# Patient Record
Sex: FEMALE | Race: WHITE | Employment: UNEMPLOYED | URBAN - METROPOLITAN AREA
[De-identification: names, ages, dates, MRNs, and addresses within clinical notes are randomized per-mention and may not be internally consistent; named-entity substitution may affect disease eponyms.]

---

## 2017-01-18 ENCOUNTER — OFFICE VISIT (OUTPATIENT)
Dept: FAMILY MEDICINE CLINIC | Age: 62
End: 2017-01-18

## 2017-01-18 VITALS
BODY MASS INDEX: 25.53 KG/M2 | RESPIRATION RATE: 16 BRPM | SYSTOLIC BLOOD PRESSURE: 128 MMHG | DIASTOLIC BLOOD PRESSURE: 66 MMHG | WEIGHT: 135.2 LBS | HEIGHT: 61 IN | TEMPERATURE: 98.3 F | HEART RATE: 89 BPM | OXYGEN SATURATION: 96 %

## 2017-01-18 DIAGNOSIS — F32.A DEPRESSION, UNSPECIFIED DEPRESSION TYPE: Primary | ICD-10-CM

## 2017-01-18 DIAGNOSIS — E78.5 HYPERLIPIDEMIA, UNSPECIFIED HYPERLIPIDEMIA TYPE: ICD-10-CM

## 2017-01-18 RX ORDER — FLUOXETINE HYDROCHLORIDE 20 MG/1
20 CAPSULE ORAL DAILY
Qty: 30 CAP | Refills: 0 | Status: SHIPPED | OUTPATIENT
Start: 2017-01-18 | End: 2017-07-24

## 2017-01-18 RX ORDER — TRAZODONE HYDROCHLORIDE 50 MG/1
50 TABLET ORAL
Qty: 30 TAB | Refills: 2 | Status: SHIPPED | OUTPATIENT
Start: 2017-01-18 | End: 2017-05-26 | Stop reason: SDUPTHER

## 2017-01-18 RX ORDER — KETOCONAZOLE 20 MG/G
CREAM TOPICAL
COMMUNITY
Start: 2017-01-11

## 2017-01-18 RX ORDER — PRAVASTATIN SODIUM 10 MG/1
10 TABLET ORAL
Qty: 30 TAB | Refills: 3 | Status: SHIPPED | OUTPATIENT
Start: 2017-01-18 | End: 2017-02-15 | Stop reason: SDUPTHER

## 2017-01-18 RX ORDER — BETAMETHASONE DIPROPIONATE 0.5 MG/G
OINTMENT TOPICAL
COMMUNITY
Start: 2017-01-11 | End: 2017-07-24

## 2017-01-18 NOTE — PROGRESS NOTES
1. Have you been to the ER, urgent care clinic since your last visit? Hospitalized since your last visit? No    2. Have you seen or consulted any other health care providers outside of the [address] [place] [address] since your last visit? Include any pap smears or colon screening. Angie Lay [place] on 1/11/17. Patient would like to changing Prozac dose to 20 mg. Patient is not sure if it's the Prozac, but she feels way too anxious. She is not taking Valium.

## 2017-01-18 NOTE — PATIENT INSTRUCTIONS
Depression and Chronic Disease: Care Instructions  Your Care Instructions  A chronic disease is one that you have for a long time. Some chronic diseases can be controlled, but they usually cannot be cured. Depression is common in people with chronic diseases, but it often goes unnoticed. Many people have concerns about seeking treatment for a mental health problem. You may think it's a sign of weakness, or you don't want people to know about it. It's important to overcome these reasons for not seeking treatment. Treating depression or anxiety is good for your health. Follow-up care is a key part of your treatment and safety. Be sure to make and go to all appointments, and call your doctor if you are having problems. It's also a good idea to know your test results and keep a list of the medicines you take. How can you care for yourself at home? Watch for symptoms of depression  The symptoms of depression are often subtle at first. You may think they are caused by your disease rather than depression. Or you may think it is normal to be depressed when you have a chronic disease. If you are depressed you may:  · Feel sad or hopeless. · Feel guilty or worthless. · Not enjoy the things you used to enjoy. · Feel hopeless, as though life is not worth living. · Have trouble thinking or remembering. · Have low energy, and you may not eat or sleep well. · Pull away from others. · Think often about death or killing yourself. (Keep the numbers for these national suicide hotlines: 9-244-869-TALK [1-232.869.2107] and 8-978-UUUUMNE [1-488.174.8140]. )  Get treatment  By treating your depression, you can feel more hopeful and have more energy. If you feel better, you may take better care of yourself, so your health may improve. · Talk to your doctor if you have any changes in mood during treatment for your disease. · Ask your doctor for help.  Counseling, antidepressant medicine, or a combination of the two can help most people with depression. Often a combination works best. Counseling can also help you cope with having a chronic disease. When should you call for help? Call 911 anytime you think you may need emergency care. For example, call if:  · You feel like hurting yourself or someone else. · Someone you know has depression and is about to attempt or is attempting suicide. Call your doctor now or seek immediate medical care if:  · You hear voices. · Someone you know has depression and:  ¨ Starts to give away his or her possessions. ¨ Uses illegal drugs or drinks alcohol heavily. ¨ Talks or writes about death, including writing suicide notes or talking about guns, knives, or pills. ¨ Starts to spend a lot of time alone. ¨ Acts very aggressively or suddenly appears calm. Watch closely for changes in your health, and be sure to contact your doctor if:  · You do not get better as expected. Where can you learn more? Go to http://angela-ashley.info/. Enter B144 in the search box to learn more about \"Depression and Chronic Disease: Care Instructions. \"  Current as of: July 26, 2016  Content Version: 11.1  © 8238-2131 [employer]. Care instructions adapted under license by [employer] (which disclaims liability or warranty for this information). If you have questions about a medical condition or this instruction, always ask your healthcare professional. [address] any warranty or liability for your use of this information.

## 2017-01-18 NOTE — PROGRESS NOTES
HISTORY OF PRESENT ILLNESS  Cash Tang is a 64 y.o. female. HPI: here for follow up on depression / anxiety. On prozac 40mg. Feels too much. Wants to go down on dose. Feels gittery and palpitation on medication since increased the dose . No suicidal ideation. Sleeping trouble. Appetite fair. Weight stable. During holidays did not decorate this year. Brought her mom home for sometime and visited family. Was travelling as well. Current counselor is moving out of area. Going to contact couple of other therapist from the same office and see with whom she is feeling comfortable. Denies any other complains or concern. Denies any headache, dizziness, no chest pain or trouble breathing, no arm or leg weakness. No nausea or vomiting. No urine or bowel complains, no palpitation, no diaphoresis. Visit Vitals    /66 (BP 1 Location: Left arm, BP Patient Position: Sitting)    Pulse 89    Temp 98.3 °F (36.8 °C) (Oral)    Resp 16    Ht 5' 1\" (1.549 m)    Wt 135 lb 3.2 oz (61.3 kg)    SpO2 96%    BMI 25.55 kg/m2     Lab Results   Component Value Date/Time    Cholesterol, total 219 11/01/2016 10:36 AM    HDL Cholesterol 83 11/01/2016 10:36 AM    LDL, calculated 119.8 11/01/2016 10:36 AM    VLDL, calculated 16.2 11/01/2016 10:36 AM    Triglyceride 81 11/01/2016 10:36 AM    CHOL/HDL Ratio 2.6 11/01/2016 10:36 AM     Lab Results   Component Value Date/Time    Sodium 141 11/01/2016 10:36 AM    Potassium 5.0 11/01/2016 10:36 AM    Chloride 104 11/01/2016 10:36 AM    CO2 31 11/01/2016 10:36 AM    Anion gap 6 11/01/2016 10:36 AM    Glucose 97 11/01/2016 10:36 AM    BUN 11 11/01/2016 10:36 AM    Creatinine 0.60 11/01/2016 10:36 AM    BUN/Creatinine ratio 18 11/01/2016 10:36 AM    GFR est AA >60 11/01/2016 10:36 AM    GFR est non-AA >60 11/01/2016 10:36 AM    Calcium 9.1 11/01/2016 10:36 AM    Bilirubin, total 0.4 11/01/2016 10:36 AM    ALT 18 11/01/2016 10:36 AM    AST 16 11/01/2016 10:36 AM    Alk.  phosphatase 42 11/01/2016 10:36 AM    Protein, total 7.2 11/01/2016 10:36 AM    Albumin 4.0 11/01/2016 10:36 AM    Globulin 3.2 11/01/2016 10:36 AM    A-G Ratio 1.3 11/01/2016 10:36 AM     Lab Results   Component Value Date/Time    TSH 2.330 04/06/2015 12:00 AM     Lab Results   Component Value Date/Time    Hemoglobin A1c 5.0 11/01/2016 10:36 AM         ROS: see HPI     Physical Exam   Constitutional: She is oriented to person, place, and time. No distress. Cardiovascular: Normal heart sounds. Pulmonary/Chest: No respiratory distress. She has no wheezes. Abdominal: There is no tenderness. Neurological: She is oriented to person, place, and time. Psychiatric: Thought content normal.   Sad        ASSESSMENT and PLAN    ICD-10-CM ICD-9-CM    1. Depression, unspecified depression type: for now changing medication from prozac to trazodone. Discuss to take both for couple of weeks and then taper prozac. For now she will take prozac 20mg. She will contact counselor to schedule an appt. Advised to call back or come back early with any concern. F32.9 311 traZODone (DESYREL) 50 mg tablet      FLUoxetine (PROZAC) 20 mg capsule   2. Hyperlipidemia, unspecified hyperlipidemia type/ diet modification: given medication refill. E78.5 272.4 pravastatin (PRAVACHOL) 10 mg tablet   Pt understood and agrees with above plan. Review    Follow-up Disposition:  Return in about 4 weeks (around 2/15/2017).

## 2017-01-18 NOTE — MR AVS SNAPSHOT
Visit Information Date & Time Provider Department Dept. Phone Encounter #  
 1/18/2017 12:45 PM Mary Johnson, [address] [facility] [phone] [id number] Follow-up Instructions Return in about 4 weeks (around 2/15/2017). Upcoming Health Maintenance Date Due COLONOSCOPY 6/1/2017 BREAST CANCER SCRN MAMMOGRAM 11/14/2018 DTaP/Tdap/Td series (2 - Td) 4/24/2025 Allergies as of 1/18/2017  Review Complete On: 1/18/2017 By: Johnson Noble Severity Noted Reaction Type Reactions Adhesive Tape-silicones  28/54/5988    Hives Azithromycin  01/13/2011    Hives Buspar [Buspirone]  04/24/2015    Palpitations Clindamycin  01/13/2011    Hives Pcn [Penicillins]  01/13/2011    Hives Statins-hmg-coa Reductase Inhibitors  10/13/2015    Other (comments) Leg cramps Current Immunizations  Never Reviewed Name Date Tdap 4/24/2015 Not reviewed this visit You Were Diagnosed With   
  
 Codes Comments Depression, unspecified depression type    -  Primary ICD-10-CM: F32.9 ICD-9-CM: 386 Hyperlipidemia, unspecified hyperlipidemia type     ICD-10-CM: E78.5 ICD-9-CM: 272.4 Vitals BP Pulse Temp Resp Height(growth percentile) Weight(growth percentile) 128/66 (BP 1 Location: Left arm, BP Patient Position: Sitting) 89 98.3 °F (36.8 °C) (Oral) 16 5' 1\" (1.549 m) 135 lb 3.2 oz (61.3 kg) SpO2 BMI OB Status Smoking Status 96% 25.55 kg/m2 Hysterectomy Former Smoker Vitals History BMI and BSA Data Body Mass Index Body Surface Area 25.55 kg/m 2 1.62 m 2 Preferred Pharmacy Pharmacy Name Phone [address], [address] [place] [address] [phone] [facility] [phone] Your Updated Medication List  
  
   
This list is accurate as of: 1/18/17  1:05 PM.  Always use your most recent med list.  
  
  
  
  
 betamethasone dipropionate 0.05 % ointment Commonly known as:  DIPROLENE  
  
 CHROMIUM PO Take  by mouth. CO Q-10 PO Take 1 Cap by mouth daily. diazePAM 5 mg tablet Commonly known as:  VALIUM Take 1 Tab by mouth nightly as needed for Anxiety. Max Daily Amount: 5 mg. FISH OIL PO Take  by mouth. FLUoxetine 20 mg capsule Commonly known as:  PROzac Take 1 Cap by mouth daily. GAS-X PO Take  by mouth. ibuprofen 400 mg tablet Commonly known as:  MOTRIN Take  by mouth every six (6) hours as needed for Pain.  
  
 ketoconazole 2 % topical cream  
Commonly known as:  NIZORAL MULTIVITAMIN PO Take 1 Tab by mouth daily. pravastatin 10 mg tablet Commonly known as:  PRAVACHOL Take 1 Tab by mouth nightly. PROBIOTIC PO Take  by mouth. traZODone 50 mg tablet Commonly known as:  Norris [place] Take 1 Tab by mouth nightly. Prescriptions Sent to Pharmacy Refills  
 pravastatin (PRAVACHOL) 10 mg tablet 3 Sig: Take 1 Tab by mouth nightly. Class: Normal  
 Pharmacy: [address] [place] [address] Ph #: [phone] Route: Oral  
 traZODone (DESYREL) 50 mg tablet 2 Sig: Take 1 Tab by mouth nightly. Class: Normal  
 Pharmacy: [address] [place] [address] Ph #: [phone] Route: Oral  
 FLUoxetine (PROZAC) 20 mg capsule 0 Sig: Take 1 Cap by mouth daily. Class: Normal  
 Pharmacy: [address] [place] [address] Ph #: [phone] Route: Oral  
  
Follow-up Instructions Return in about 4 weeks (around 2/15/2017). Patient Instructions Depression and Chronic Disease: Care Instructions Your Care Instructions A chronic disease is one that you have for a long time. Some chronic diseases can be controlled, but they usually cannot be cured.  Depression is common in people with chronic diseases, but it often goes unnoticed. Many people have concerns about seeking treatment for a mental health problem. You may think it's a sign of weakness, or you don't want people to know about it. It's important to overcome these reasons for not seeking treatment. Treating depression or anxiety is good for your health. Follow-up care is a key part of your treatment and safety. Be sure to make and go to all appointments, and call your doctor if you are having problems. It's also a good idea to know your test results and keep a list of the medicines you take. How can you care for yourself at home? Watch for symptoms of depression The symptoms of depression are often subtle at first. You may think they are caused by your disease rather than depression. Or you may think it is normal to be depressed when you have a chronic disease. If you are depressed you may: · Feel sad or hopeless. · Feel guilty or worthless. · Not enjoy the things you used to enjoy. · Feel hopeless, as though life is not worth living. · Have trouble thinking or remembering. · Have low energy, and you may not eat or sleep well. · Pull away from others. · Think often about death or killing yourself. (Keep the numbers for these national suicide hotlines: 1-805-746-TALK [1-895.476.2668] and 7-085-JOVELFS [1-793.318.5557]. ) Get treatment By treating your depression, you can feel more hopeful and have more energy. If you feel better, you may take better care of yourself, so your health may improve. · Talk to your doctor if you have any changes in mood during treatment for your disease. · Ask your doctor for help. Counseling, antidepressant medicine, or a combination of the two can help most people with depression. Often a combination works best. Counseling can also help you cope with having a chronic disease. When should you call for help? Call 911 anytime you think you may need emergency care. For example, call if: 
· You feel like hurting yourself or someone else. · Someone you know has depression and is about to attempt or is attempting suicide. Call your doctor now or seek immediate medical care if: 
· You hear voices. · Someone you know has depression and: 
¨ Starts to give away his or her possessions. ¨ Uses illegal drugs or drinks alcohol heavily. ¨ Talks or writes about death, including writing suicide notes or talking about guns, knives, or pills. ¨ Starts to spend a lot of time alone. ¨ Acts very aggressively or suddenly appears calm. Watch closely for changes in your health, and be sure to contact your doctor if: 
· You do not get better as expected. Where can you learn more? Go to http://angela-ashley.info/. Enter Q180 in the search box to learn more about \"Depression and Chronic Disease: Care Instructions. \" Current as of: July 26, 2016 Content Version: 11.1 © 1707-6739 [employer], Incorporated. Care instructions adapted under license by [employer] (which disclaims liability or warranty for this information). If you have questions about a medical condition or this instruction, always ask your healthcare professional. [address] any warranty or liability for your use of this information. Introducing [facility]! Dear Karel Morgan: Thank you for requesting a Sovi account. Our records indicate that you already have an active Sovi account. You can access your account anytime at [email or web address] Did you know that you can access your hospital and ER discharge instructions at any time in Sovi? You can also review all of your test results from your hospital stay or ER visit. Additional Information If you have questions, please visit the Frequently Asked Questions section of the [employer] website at [email or web address] Remember, [employer] is NOT to be used for urgent needs. For medical emergencies, dial 911. Now available from your iPhone and Android! Please provide this summary of care documentation to your next provider. Your primary care clinician is listed as Bambi Robles. If you have any questions after today's visit, please call [phone].

## 2017-02-07 ENCOUNTER — OFFICE VISIT (OUTPATIENT)
Dept: FAMILY MEDICINE CLINIC | Age: 62
End: 2017-02-07

## 2017-02-07 VITALS
HEIGHT: 61 IN | DIASTOLIC BLOOD PRESSURE: 86 MMHG | HEART RATE: 68 BPM | BODY MASS INDEX: 25.3 KG/M2 | WEIGHT: 134 LBS | RESPIRATION RATE: 16 BRPM | TEMPERATURE: 98 F | OXYGEN SATURATION: 98 % | SYSTOLIC BLOOD PRESSURE: 138 MMHG

## 2017-02-07 DIAGNOSIS — J02.9 SORE THROAT: ICD-10-CM

## 2017-02-07 DIAGNOSIS — J20.9 ACUTE BRONCHITIS, UNSPECIFIED ORGANISM: Primary | ICD-10-CM

## 2017-02-07 DIAGNOSIS — R05.9 COUGH: ICD-10-CM

## 2017-02-07 LAB
QUICKVUE INFLUENZA TEST: NEGATIVE
S PYO AG THROAT QL: NEGATIVE
VALID INTERNAL CONTROL?: YES
VALID INTERNAL CONTROL?: YES

## 2017-02-07 RX ORDER — LEVOFLOXACIN 500 MG/1
500 TABLET, FILM COATED ORAL DAILY
Qty: 7 TAB | Refills: 0 | Status: SHIPPED | OUTPATIENT
Start: 2017-02-07 | End: 2017-02-08 | Stop reason: ALTCHOICE

## 2017-02-07 RX ORDER — METHYLPREDNISOLONE 4 MG/1
TABLET ORAL
Qty: 1 DOSE PACK | Refills: 0 | Status: SHIPPED | OUTPATIENT
Start: 2017-02-07 | End: 2017-03-01 | Stop reason: ALTCHOICE

## 2017-02-07 RX ORDER — ALBUTEROL SULFATE 90 UG/1
2 AEROSOL, METERED RESPIRATORY (INHALATION)
Qty: 1 INHALER | Refills: 0 | Status: SHIPPED | OUTPATIENT
Start: 2017-02-07 | End: 2017-03-01

## 2017-02-07 NOTE — PATIENT INSTRUCTIONS
Bronchitis: Care Instructions  Your Care Instructions    Bronchitis is inflammation of the bronchial tubes, which carry air to the lungs. The tubes swell and produce mucus, or phlegm. The mucus and inflamed bronchial tubes make you cough. You may have trouble breathing. Most cases of bronchitis are caused by viruses like those that cause colds. Antibiotics usually do not help and they may be harmful. Bronchitis usually develops rapidly and lasts about 2 to 3 weeks in otherwise healthy people. Follow-up care is a key part of your treatment and safety. Be sure to make and go to all appointments, and call your doctor if you are having problems. It's also a good idea to know your test results and keep a list of the medicines you take. How can you care for yourself at home? · Take all medicines exactly as prescribed. Call your doctor if you think you are having a problem with your medicine. · Get some extra rest.  · Take an over-the-counter pain medicine, such as acetaminophen (Tylenol), ibuprofen (Advil, Motrin), or naproxen (Aleve) to reduce fever and relieve body aches. Read and follow all instructions on the label. · Do not take two or more pain medicines at the same time unless the doctor told you to. Many pain medicines have acetaminophen, which is Tylenol. Too much acetaminophen (Tylenol) can be harmful. · Take an over-the-counter cough medicine that contains dextromethorphan to help quiet a dry, hacking cough so that you can sleep. Avoid cough medicines that have more than one active ingredient. Read and follow all instructions on the label. · Breathe moist air from a humidifier, hot shower, or sink filled with hot water. The heat and moisture will thin mucus so you can cough it out. · Do not smoke. Smoking can make bronchitis worse. If you need help quitting, talk to your doctor about stop-smoking programs and medicines. These can increase your chances of quitting for good.   When should you call for help? Call 911 anytime you think you may need emergency care. For example, call if:  · You have severe trouble breathing. Call your doctor now or seek immediate medical care if:  · You have new or worse trouble breathing. · You cough up dark brown or bloody mucus (sputum). · You have a new or higher fever. · You have a new rash. Watch closely for changes in your health, and be sure to contact your doctor if:  · You cough more deeply or more often, especially if you notice more mucus or a change in the color of your mucus. · You are not getting better as expected. Where can you learn more? Go to http://angela-ashley.info/. Enter H333 in the search box to learn more about \"Bronchitis: Care Instructions. \"  Current as of: May 23, 2016  Content Version: 11.1  © 0328-3628 [employer]. Care instructions adapted under license by [employer] (which disclaims liability or warranty for this information). If you have questions about a medical condition or this instruction, always ask your healthcare professional. [address] any warranty or liability for your use of this information.

## 2017-02-07 NOTE — PROGRESS NOTES
Walker Hernández, 64 y.o.,  female    SUBJECTIVE  Cough x 1 week (Dr. Nic Red pt)    C/o nasal congestion, sore throat, feeling feverish last week, then developed into chest congestion and dry cough. She is leaving for [facility] in a few days and concerned about getting worse there. Denies sob, wheezing. ROS:  See HPI, all others negative        Patient Active Problem List   Diagnosis Code    Hyperlipidemia/ not on statin due to leg cramps/ diet modification E78.5    Chest pain R07.9    Essential hypertension, benign I10    Hiatal hernia K44.9    Gastritis K29.70    Depression with anxiety F41.8    Bladder prolapse, female, acquired/ seeing urogynecology N81.10       Current Outpatient Prescriptions   Medication Sig Dispense Refill    methylPREDNISolone (MEDROL, LUIZ,) 4 mg tablet Per dose pack instructions 1 Dose Pack 0    albuterol (PROVENTIL HFA, VENTOLIN HFA, PROAIR HFA) 90 mcg/actuation inhaler Take 2 Puffs by inhalation every four (4) hours as needed for Wheezing. 1 Inhaler 0    levoFLOXacin (LEVAQUIN) 500 mg tablet Take 1 Tab by mouth daily for 7 days. 7 Tab 0    ketoconazole (NIZORAL) 2 % topical cream       pravastatin (PRAVACHOL) 10 mg tablet Take 1 Tab by mouth nightly. 30 Tab 3    traZODone (DESYREL) 50 mg tablet Take 1 Tab by mouth nightly. (Patient taking differently: Take 50 mg by mouth nightly. Patient is taking 25 mg instead of 50 mg cutting in half) 30 Tab 2    FLUoxetine (PROZAC) 20 mg capsule Take 1 Cap by mouth daily. 30 Cap 0    MULTIVITAMIN PO Take 1 Tab by mouth daily.  SIMETHICONE (GAS-X PO) Take  by mouth.  LACTOBACILLUS ACIDOPHILUS (PROBIOTIC PO) Take  by mouth.  DOCOSAHEXANOIC ACID/EPA (FISH OIL PO) Take  by mouth.  CHROMIUM PO Take  by mouth.  UBIDECARENONE (CO Q-10 PO) Take 1 Cap by mouth daily.       betamethasone dipropionate (DIPROLENE) 0.05 % ointment          Allergies   Allergen Reactions    Adhesive Tape-Silicones Hives    Azithromycin Hives    Buspar [Buspirone] Palpitations    Clindamycin Hives    Pcn [Penicillins] Hives    Statins-Hmg-Coa Reductase Inhibitors Other (comments)     Leg cramps         Past Medical History   Diagnosis Date    Gastritis     GERD (gastroesophageal reflux disease)     Hiatal hernia     Hyperlipidemia     Reflux        Social History     Social History    Marital status:      Spouse name: N/A    Number of children: N/A    Years of education: N/A     Occupational History    Not on file. Social History Main Topics    Smoking status: Former Smoker     Packs/day: 1.50     Years: 6.00     Quit date: 2/11/1978    Smokeless tobacco: Never Used      Comment: Pt quit smoking in 1979    Alcohol use Yes      Comment: occasional    Drug use: No    Sexual activity: Not Currently     Partners: Male     Other Topics Concern    Not on file     Social History Narrative       Family History   Problem Relation Age of Onset    Hypertension Mother     Heart Attack Father 76    Hypertension Father     Cancer Father      history of colon    Ovarian Cancer Maternal Aunt     Breast Cancer Other     Diabetes Maternal Grandmother          OBJECTIVE    Physical Exam:     Visit Vitals    /86 (BP 1 Location: Left arm, BP Patient Position: Sitting)    Pulse 68    Temp 98 °F (36.7 °C) (Oral)    Resp 16    Ht 5' 1\" (1.549 m)    Wt 134 lb (60.8 kg)    SpO2 98%    BMI 25.32 kg/m2       General: alert, mildly ill-appearing,  in no apparent distress or pain  Head: atraumatic.  Non-tender maxillary and frontal sinuses  Eyes: Lids with no discharge, no matting, conjunctivae clear and non injected, full EOMs, PERLLA  Ears: pinna non-tender, external auditory canal patent, TM intact  Mouth/throat:tonsils non enlarged, pharynx non erythematous and no lesion, nasal mucosa normal  Neck: supple, no adenopathy palpated  CVS: normal rate, regular rhythm, distinct S1 and S2  Lungs:clear to ausculation bilaterally, no crackles, wheezing or rhonchi noted  Skin: warm, no lesions, rashes noted  Psych:  mood and affect normal        ASSESSMENT/PLAN  Karel Morgan was seen today for cough, nasal congestion and sore throat. Diagnoses and all orders for this visit:    Acute bronchitis, unspecified organism  -     methylPREDNISolone (MEDROL, LUIZ,) 4 mg tablet; Per dose pack instructions  -     albuterol (PROVENTIL HFA, VENTOLIN HFA, PROAIR HFA) 90 mcg/actuation inhaler; Take 2 Puffs by inhalation every four (4) hours as needed for Wheezing.  -     levoFLOXacin (LEVAQUIN) 500 mg tablet; Take 1 Tab by mouth daily for 7 days. Sore throat  -     AMB POC RAPID STREP A  -     AMB POC RAPID INFLUENZA TEST    Cough  -     AMB POC RAPID STREP A  -     AMB POC RAPID INFLUENZA TEST      Follow-up Disposition:  Return in about 3 weeks (around 2/28/2017), or if symptoms worsen or fail to improve, with Dr. Danyell Jesus. Patient understands plan of care. Patient has provided input and agrees with goals.

## 2017-02-07 NOTE — PROGRESS NOTES
Chief Complaint   Patient presents with    Cough     chest congestion    Nasal Congestion     1. Have you been to the ER, urgent care clinic since your last visit? Hospitalized since your last visit? No    2. Have you seen or consulted any other health care providers outside of the [address] [place] [address] since your last visit? Include any pap smears or colon screening.  No

## 2017-02-07 NOTE — MR AVS SNAPSHOT
Visit Information Date & Time Provider Department Dept. Phone Encounter #  
 2/7/2017  4:45 PM Juan Jose Fish, [address] [id number] Follow-up Instructions Return in about 3 weeks (around 2/28/2017), or if symptoms worsen or fail to improve, with Dr. Tello Field. Your Appointments 3/1/2017  9:00 AM  
ROUTINE CARE with Kash Garrett MD  
[facility] ([facility]) Appt Note: 1 month followup [address] [place] [address] Drive [address] [facility] [address]  
[address] [facility] [address] [facility] [address] Upcoming Health Maintenance Date Due COLONOSCOPY 6/1/2017 BREAST CANCER SCRN MAMMOGRAM 11/14/2018 DTaP/Tdap/Td series (2 - Td) 4/24/2025 Allergies as of 2/7/2017  Review Complete On: 2/7/2017 By: Juan Jose Fish MD  
  
 Severity Noted Reaction Type Reactions Adhesive Tape-silicones  28/57/3428    Hives Azithromycin  01/13/2011    Hives Buspar [Buspirone]  04/24/2015    Palpitations Clindamycin  01/13/2011    Hives Pcn [Penicillins]  01/13/2011    Hives Statins-hmg-coa Reductase Inhibitors  10/13/2015    Other (comments) Leg cramps Current Immunizations  Never Reviewed Name Date Tdap 4/24/2015 Not reviewed this visit You Were Diagnosed With   
  
 Codes Comments Acute bronchitis, unspecified organism    -  Primary ICD-10-CM: J20.9 ICD-9-CM: 466.0 Sore throat     ICD-10-CM: J02.9 ICD-9-CM: 763 Cough     ICD-10-CM: R05 ICD-9-CM: 310. 2 Vitals BP Pulse Temp Resp Height(growth percentile) Weight(growth percentile) 138/86 (BP 1 Location: Left arm, BP Patient Position: Sitting) 68 98 °F (36.7 °C) (Oral) 16 5' 1\" (1.549 m) 134 lb (60.8 kg) SpO2 BMI OB Status Smoking Status 98% 25.32 kg/m2 Hysterectomy Former Smoker Vitals History BMI and BSA Data Body Mass Index Body Surface Area 25.32 kg/m 2 1.62 m 2 Preferred Pharmacy Pharmacy Name Phone [address], [address] [facility] 22 [address] [facility] [phone] Your Updated Medication List  
  
   
This list is accurate as of: 2/7/17  5:22 PM.  Always use your most recent med list.  
  
  
  
  
 albuterol 90 mcg/actuation inhaler Commonly known as:  PROVENTIL HFA, VENTOLIN HFA, PROAIR HFA Take 2 Puffs by inhalation every four (4) hours as needed for Wheezing. betamethasone dipropionate 0.05 % ointment Commonly known as:  DIPROLENE  
  
 CHROMIUM PO Take  by mouth. CO Q-10 PO Take 1 Cap by mouth daily. FISH OIL PO Take  by mouth. FLUoxetine 20 mg capsule Commonly known as:  PROzac Take 1 Cap by mouth daily. GAS-X PO Take  by mouth.  
  
 ketoconazole 2 % topical cream  
Commonly known as:  NIZORAL  
  
 levoFLOXacin 500 mg tablet Commonly known as:  Priyanka Pih Take 1 Tab by mouth daily for 7 days. methylPREDNISolone 4 mg tablet Commonly known as:  MEDROL (LUIZ) Per dose pack instructions MULTIVITAMIN PO Take 1 Tab by mouth daily. pravastatin 10 mg tablet Commonly known as:  PRAVACHOL Take 1 Tab by mouth nightly. PROBIOTIC PO Take  by mouth. traZODone 50 mg tablet Commonly known as:  Patrick Bump Take 1 Tab by mouth nightly. Prescriptions Sent to Pharmacy Refills  
 methylPREDNISolone (MEDROL, LUIZ,) 4 mg tablet 0 Sig: Per dose pack instructions Class: Normal  
 Pharmacy: [facility] Ph #: [phone]  
 albuterol (PROVENTIL HFA, VENTOLIN HFA, PROAIR HFA) 90 mcg/actuation inhaler 0 Sig: Take 2 Puffs by inhalation every four (4) hours as needed for Wheezing. Class: Normal  
 Pharmacy: [address] [place] [address] [address] [facility] Ph #: [phone] Route: Inhalation  
 levoFLOXacin (LEVAQUIN) 500 mg tablet 0 Sig: Take 1 Tab by mouth daily for 7 days. Class: Normal  
 Pharmacy: [address] [place] [address] #: [phone] Route: Oral  
  
We Performed the Following AMB POC RAPID INFLUENZA TEST [57989 CPT(R)] AMB POC RAPID STREP A [64385 CPT(R)] Follow-up Instructions Return in about 3 weeks (around 2/28/2017), or if symptoms worsen or fail to improve, with Dr. Danyell Jesus. Patient Instructions Bronchitis: Care Instructions Your Care Instructions Bronchitis is inflammation of the bronchial tubes, which carry air to the lungs. The tubes swell and produce mucus, or phlegm. The mucus and inflamed bronchial tubes make you cough. You may have trouble breathing. Most cases of bronchitis are caused by viruses like those that cause colds. Antibiotics usually do not help and they may be harmful. Bronchitis usually develops rapidly and lasts about 2 to 3 weeks in otherwise healthy people. Follow-up care is a key part of your treatment and safety. Be sure to make and go to all appointments, and call your doctor if you are having problems. It's also a good idea to know your test results and keep a list of the medicines you take. How can you care for yourself at home? · Take all medicines exactly as prescribed. Call your doctor if you think you are having a problem with your medicine. · Get some extra rest. 
· Take an over-the-counter pain medicine, such as acetaminophen (Tylenol), ibuprofen (Advil, Motrin), or naproxen (Aleve) to reduce fever and relieve body aches. Read and follow all instructions on the label. · Do not take two or more pain medicines at the same time unless the doctor told you to. Many pain medicines have acetaminophen, which is Tylenol. Too much acetaminophen (Tylenol) can be harmful. · Take an over-the-counter cough medicine that contains dextromethorphan to help quiet a dry, hacking cough so that you can sleep. Avoid cough medicines that have more than one active ingredient. Read and follow all instructions on the label. · Breathe moist air from a humidifier, hot shower, or sink filled with hot water. The heat and moisture will thin mucus so you can cough it out. · Do not smoke. Smoking can make bronchitis worse. If you need help quitting, talk to your doctor about stop-smoking programs and medicines. These can increase your chances of quitting for good. When should you call for help? Call 911 anytime you think you may need emergency care. For example, call if: 
· You have severe trouble breathing. Call your doctor now or seek immediate medical care if: 
· You have new or worse trouble breathing. · You cough up dark brown or bloody mucus (sputum). · You have a new or higher fever. · You have a new rash. Watch closely for changes in your health, and be sure to contact your doctor if: 
· You cough more deeply or more often, especially if you notice more mucus or a change in the color of your mucus. · You are not getting better as expected. Where can you learn more? Go to http://angela-ashley.info/. Enter H333 in the search box to learn more about \"Bronchitis: Care Instructions. \" Current as of: May 23, 2016 Content Version: 11.1 © 3817-2597 [employer]. Care instructions adapted under license by [employer] (which disclaims liability or warranty for this information). If you have questions about a medical condition or this instruction, always ask your healthcare professional. [address] any warranty or liability for your use of this information. Introducing [facility]! Dear Ailyn Barreto: Thank you for requesting a Go Voc[employer] account.   Our records indicate that you already have an active [employer] account. You can access your account anytime at [email or web address] Did you know that you can access your hospital and ER discharge instructions at any time in [employer]? You can also review all of your test results from your hospital stay or ER visit. Additional Information If you have questions, please visit the Frequently Asked Questions section of the [employer] website at [email or web address] Remember, [employer] is NOT to be used for urgent needs. For medical emergencies, dial 911. Now available from your iPhone and Android! Please provide this summary of care documentation to your next provider. Your primary care clinician is listed as Corby Castaneda. If you have any questions after today's visit, please call [phone].

## 2017-02-08 ENCOUNTER — TELEPHONE (OUTPATIENT)
Dept: FAMILY MEDICINE CLINIC | Age: 62
End: 2017-02-08

## 2017-02-08 DIAGNOSIS — R05.9 COUGH: Primary | ICD-10-CM

## 2017-02-08 RX ORDER — DOXYCYCLINE 100 MG/1
100 TABLET ORAL 2 TIMES DAILY
Qty: 14 TAB | Refills: 0 | Status: SHIPPED | OUTPATIENT
Start: 2017-02-08 | End: 2017-02-15

## 2017-02-08 NOTE — TELEPHONE ENCOUNTER
Pt states she went to pharmacy to  Levofloxacin along with the other medications and per the pharmacist pt states they told her there could be an interaction. Pt does not want to take the antibiotic and would like to know if Dr. Katerine Hines can change it to something else.   Please advise

## 2017-02-08 NOTE — TELEPHONE ENCOUNTER
Spoke with patient (2 verifiers name/) regarding Dr Rika Matthews has changed her antibiotic and sent it to the pharmacy. Patient also informed to keep scheduled follow up appt on 2017. Patient voiced understanding.

## 2017-02-15 DIAGNOSIS — E78.5 HYPERLIPIDEMIA, UNSPECIFIED HYPERLIPIDEMIA TYPE: ICD-10-CM

## 2017-02-15 RX ORDER — PRAVASTATIN SODIUM 10 MG/1
TABLET ORAL
Qty: 30 TAB | Refills: 0 | Status: SHIPPED | OUTPATIENT
Start: 2017-02-15 | End: 2017-03-01 | Stop reason: SDUPTHER

## 2017-03-01 ENCOUNTER — OFFICE VISIT (OUTPATIENT)
Dept: FAMILY MEDICINE CLINIC | Age: 62
End: 2017-03-01

## 2017-03-01 VITALS
HEIGHT: 61 IN | WEIGHT: 141.4 LBS | DIASTOLIC BLOOD PRESSURE: 68 MMHG | TEMPERATURE: 98.2 F | RESPIRATION RATE: 16 BRPM | HEART RATE: 73 BPM | SYSTOLIC BLOOD PRESSURE: 116 MMHG | OXYGEN SATURATION: 97 % | BODY MASS INDEX: 26.7 KG/M2

## 2017-03-01 DIAGNOSIS — E78.5 HYPERLIPIDEMIA, UNSPECIFIED HYPERLIPIDEMIA TYPE: ICD-10-CM

## 2017-03-01 DIAGNOSIS — F32.A DEPRESSION, UNSPECIFIED DEPRESSION TYPE: ICD-10-CM

## 2017-03-01 RX ORDER — FLUOXETINE HYDROCHLORIDE 20 MG/1
20 CAPSULE ORAL DAILY
Qty: 30 CAP | Refills: 0 | Status: CANCELLED | OUTPATIENT
Start: 2017-03-01

## 2017-03-01 RX ORDER — PRAVASTATIN SODIUM 10 MG/1
10 TABLET ORAL DAILY
Qty: 30 TAB | Refills: 3 | Status: SHIPPED | OUTPATIENT
Start: 2017-03-01 | End: 2017-07-24 | Stop reason: SDUPTHER

## 2017-03-01 NOTE — PROGRESS NOTES
HISTORY OF PRESENT ILLNESS  Madhav Weaver is a 64 y.o. female. HPI: here for follow up on depression. Last visit changed Prozac to trazodone. Now in transition and no concern. Still taking Prozac alternate day and i have advised her to go off completely in a week and currently she is taking half tab of trazodone which i have advised to go full dose. She recently came back from [place] trip and having some sleep changes and said trazodone is helping. No specific concern or side effects. No anxiety attack. She looks more relax and happy compare to prior visit. Denies any headache, dizziness, no chest pain or trouble breathing, no arm or leg weakness. No nausea or vomiting, no weight or appetite changes. No urine or bowel complains, no palpitation, no diaphoresis. Visit Vitals    /68 (BP 1 Location: Left arm, BP Patient Position: Sitting)    Pulse 73    Temp 98.2 °F (36.8 °C) (Oral)    Resp 16    Ht 5' 1\" (1.549 m)    Wt 141 lb 6.4 oz (64.1 kg)    SpO2 97%    BMI 26.72 kg/m2     Review medication list, vitals, problem list,allergies. No specific concern today. Has been tolerating her statin well. Review prior labs.    Lab Results   Component Value Date/Time    Cholesterol, total 219 11/01/2016 10:36 AM    HDL Cholesterol 83 11/01/2016 10:36 AM    LDL, calculated 119.8 11/01/2016 10:36 AM    VLDL, calculated 16.2 11/01/2016 10:36 AM    Triglyceride 81 11/01/2016 10:36 AM    CHOL/HDL Ratio 2.6 11/01/2016 10:36 AM     Lab Results   Component Value Date/Time    Sodium 141 11/01/2016 10:36 AM    Potassium 5.0 11/01/2016 10:36 AM    Chloride 104 11/01/2016 10:36 AM    CO2 31 11/01/2016 10:36 AM    Anion gap 6 11/01/2016 10:36 AM    Glucose 97 11/01/2016 10:36 AM    BUN 11 11/01/2016 10:36 AM    Creatinine 0.60 11/01/2016 10:36 AM    BUN/Creatinine ratio 18 11/01/2016 10:36 AM    GFR est AA >60 11/01/2016 10:36 AM    GFR est non-AA >60 11/01/2016 10:36 AM    Calcium 9.1 11/01/2016 10:36 AM    Bilirubin, total 0.4 11/01/2016 10:36 AM    AST (SGOT) 16 11/01/2016 10:36 AM    Alk. phosphatase 42 11/01/2016 10:36 AM    Protein, total 7.2 11/01/2016 10:36 AM    Albumin 4.0 11/01/2016 10:36 AM    Globulin 3.2 11/01/2016 10:36 AM    A-G Ratio 1.3 11/01/2016 10:36 AM    ALT (SGPT) 18 11/01/2016 10:36 AM     Lab Results   Component Value Date/Time    TSH 2.330 04/06/2015 12:00 AM       Lab Results   Component Value Date/Time    Hemoglobin A1c 5.0 11/01/2016 10:36 AM     ROS: see HPI     Physical Exam   Constitutional: She is oriented to person, place, and time. No distress. Neck: No thyromegaly present. Cardiovascular: Normal rate, regular rhythm and normal heart sounds. Pulmonary/Chest:   CTA   Abdominal: Soft. Bowel sounds are normal. There is no tenderness. Musculoskeletal: She exhibits no edema. Neurological: She is oriented to person, place, and time. Psychiatric: Her behavior is normal. Thought content normal.       ASSESSMENT and PLAN    ICD-10-CM ICD-9-CM    1. Depression, unspecified depression type: switched prozac to trazodone. So far no concern. Will go completely off from prozac and will take full one tab of trazodone. F/u next visit. F32.9 311    2. Hyperlipidemia, unspecified hyperlipidemia type/ diet modification: given medication refill. Will f/u next visit. Probably labs to be order next visit. E78.5 272.4 pravastatin (PRAVACHOL) 10 mg tablet   Pt understood and agrees with above plan. Review   She was given number for GI to schedule appt for colonoscopy due in June 2017. Follow-up Disposition:  Return in about 3 months (around 6/1/2017).

## 2017-03-01 NOTE — PROGRESS NOTES
1. Have you been to the ER, urgent care clinic since your last visit? Hospitalized since your last visit? No    2. Have you seen or consulted any other health care providers outside of the [address] [place] [address] since your last visit? Include any pap smears or colon screening.  No

## 2017-03-01 NOTE — PATIENT INSTRUCTIONS
Depression and Chronic Disease: Care Instructions  Your Care Instructions  A chronic disease is one that you have for a long time. Some chronic diseases can be controlled, but they usually cannot be cured. Depression is common in people with chronic diseases, but it often goes unnoticed. Many people have concerns about seeking treatment for a mental health problem. You may think it's a sign of weakness, or you don't want people to know about it. It's important to overcome these reasons for not seeking treatment. Treating depression or anxiety is good for your health. Follow-up care is a key part of your treatment and safety. Be sure to make and go to all appointments, and call your doctor if you are having problems. It's also a good idea to know your test results and keep a list of the medicines you take. How can you care for yourself at home? Watch for symptoms of depression  The symptoms of depression are often subtle at first. You may think they are caused by your disease rather than depression. Or you may think it is normal to be depressed when you have a chronic disease. If you are depressed you may:  · Feel sad or hopeless. · Feel guilty or worthless. · Not enjoy the things you used to enjoy. · Feel hopeless, as though life is not worth living. · Have trouble thinking or remembering. · Have low energy, and you may not eat or sleep well. · Pull away from others. · Think often about death or killing yourself. (Keep the numbers for these national suicide hotlines: 4-251-646-TALK [1-252.802.1512] and 6-102-UAGOAJI [1-940.848.2237]. )  Get treatment  By treating your depression, you can feel more hopeful and have more energy. If you feel better, you may take better care of yourself, so your health may improve. · Talk to your doctor if you have any changes in mood during treatment for your disease. · Ask your doctor for help.  Counseling, antidepressant medicine, or a combination of the two can help most people with depression. Often a combination works best. Counseling can also help you cope with having a chronic disease. When should you call for help? Call 911 anytime you think you may need emergency care. For example, call if:  · You feel like hurting yourself or someone else. · Someone you know has depression and is about to attempt or is attempting suicide. Call your doctor now or seek immediate medical care if:  · You hear voices. · Someone you know has depression and:  ¨ Starts to give away his or her possessions. ¨ Uses illegal drugs or drinks alcohol heavily. ¨ Talks or writes about death, including writing suicide notes or talking about guns, knives, or pills. ¨ Starts to spend a lot of time alone. ¨ Acts very aggressively or suddenly appears calm. Watch closely for changes in your health, and be sure to contact your doctor if:  · You do not get better as expected. Where can you learn more? Go to http://angelaGrowing Starsashley.info/. Enter W151 in the search box to learn more about \"Depression and Chronic Disease: Care Instructions. \"  Current as of: July 26, 2016  Content Version: 11.1  © 4383-9557 [employer]. Care instructions adapted under license by [employer] (which disclaims liability or warranty for this information). If you have questions about a medical condition or this instruction, always ask your healthcare professional. [place] [address] any warranty or liability for your use of this information. Depression and Chronic Disease: Care Instructions  Your Care Instructions  A chronic disease is one that you have for a long time. Some chronic diseases can be controlled, but they usually cannot be cured. Depression is common in people with chronic diseases, but it often goes unnoticed. Many people have concerns about seeking treatment for a mental health problem.  You may think it's a sign of weakness, or you don't want people to know about it. It's important to overcome these reasons for not seeking treatment. Treating depression or anxiety is good for your health. Follow-up care is a key part of your treatment and safety. Be sure to make and go to all appointments, and call your doctor if you are having problems. It's also a good idea to know your test results and keep a list of the medicines you take. How can you care for yourself at home? Watch for symptoms of depression  The symptoms of depression are often subtle at first. You may think they are caused by your disease rather than depression. Or you may think it is normal to be depressed when you have a chronic disease. If you are depressed you may:  · Feel sad or hopeless. · Feel guilty or worthless. · Not enjoy the things you used to enjoy. · Feel hopeless, as though life is not worth living. · Have trouble thinking or remembering. · Have low energy, and you may not eat or sleep well. · Pull away from others. · Think often about death or killing yourself. (Keep the numbers for these national suicide hotlines: 8-977-837-TALK [1-738.660.4645] and 7-298-QMEDYFI [1-357.722.3727]. )  Get treatment  By treating your depression, you can feel more hopeful and have more energy. If you feel better, you may take better care of yourself, so your health may improve. · Talk to your doctor if you have any changes in mood during treatment for your disease. · Ask your doctor for help. Counseling, antidepressant medicine, or a combination of the two can help most people with depression. Often a combination works best. Counseling can also help you cope with having a chronic disease. When should you call for help? Call 911 anytime you think you may need emergency care. For example, call if:  · You feel like hurting yourself or someone else. · Someone you know has depression and is about to attempt or is attempting suicide.   Call your doctor now or seek immediate medical care if:  · You hear voices. · Someone you know has depression and:  ¨ Starts to give away his or her possessions. ¨ Uses illegal drugs or drinks alcohol heavily. ¨ Talks or writes about death, including writing suicide notes or talking about guns, knives, or pills. ¨ Starts to spend a lot of time alone. ¨ Acts very aggressively or suddenly appears calm. Watch closely for changes in your health, and be sure to contact your doctor if:  · You do not get better as expected. Where can you learn more? Go to http://angela-ashley.info/. Enter W273 in the search box to learn more about \"Depression and Chronic Disease: Care Instructions. \"  Current as of: July 26, 2016  Content Version: 11.1  © 3685-1607 [employer]. Care instructions adapted under license by [employer] (which disclaims liability or warranty for this information). If you have questions about a medical condition or this instruction, always ask your healthcare professional. [place] [address] any warranty or liability for your use of this information.

## 2017-03-01 NOTE — MR AVS SNAPSHOT
Visit Information Date & Time Provider Department Dept. Phone Encounter #  
 3/1/2017  9:00 AM Mary Lopez, 0 [facility] [phone] [id number] Follow-up Instructions Return in about 3 months (around 6/1/2017). Upcoming Health Maintenance Date Due COLONOSCOPY 6/1/2017 BREAST CANCER SCRN MAMMOGRAM 11/14/2018 DTaP/Tdap/Td series (2 - Td) 4/24/2025 Allergies as of 3/1/2017  Review Complete On: 3/1/2017 By: Elyssa Macias Severity Noted Reaction Type Reactions Adhesive Tape-silicones  93/29/6986    Hives Azithromycin  01/13/2011    Hives Buspar [Buspirone]  04/24/2015    Palpitations Clindamycin  01/13/2011    Hives Pcn [Penicillins]  01/13/2011    Hives Statins-hmg-coa Reductase Inhibitors  10/13/2015    Other (comments) Leg cramps Current Immunizations  Never Reviewed Name Date Tdap 4/24/2015 Not reviewed this visit You Were Diagnosed With   
  
 Codes Comments Depression, unspecified depression type     ICD-10-CM: F32.9 ICD-9-CM: 400 Hyperlipidemia, unspecified hyperlipidemia type     ICD-10-CM: E78.5 ICD-9-CM: 272.4 Vitals BP  
  
  
  
  
  
 116/68 (BP 1 Location: Left arm, BP Patient Position: Sitting) BMI and BSA Data Body Mass Index Body Surface Area  
 26.72 kg/m 2 1.66 m 2 Preferred Pharmacy Pharmacy Name Phone [address], [place] [address] [facility] [phone] Your Updated Medication List  
  
   
This list is accurate as of: 3/1/17  9:30 AM.  Always use your most recent med list.  
  
  
  
  
 albuterol 90 mcg/actuation inhaler Commonly known as:  PROVENTIL HFA, VENTOLIN HFA, PROAIR HFA Take 2 Puffs by inhalation every four (4) hours as needed for Wheezing. betamethasone dipropionate 0.05 % ointment Commonly known as:  DIPROLENE  
  
 CHROMIUM PO Take  by mouth. CO Q-10 PO Take 1 Cap by mouth daily. FISH OIL PO Take  by mouth. FLUoxetine 20 mg capsule Commonly known as:  PROzac Take 1 Cap by mouth daily. GAS-X PO Take  by mouth.  
  
 ketoconazole 2 % topical cream  
Commonly known as:  NIZORAL  
  
 methylPREDNISolone 4 mg tablet Commonly known as:  MEDROL (LUIZ) Per dose pack instructions MULTIVITAMIN PO Take 1 Tab by mouth daily. OTHER  
1 Tab daily. Vitamin B with Vitamin C - 1 tab daily  
  
 pravastatin 10 mg tablet Commonly known as:  PRAVACHOL Take 1 Tab by mouth daily. PROBIOTIC PO Take  by mouth. traZODone 50 mg tablet Commonly known as:  Oletta Albertina Take 1 Tab by mouth nightly. Prescriptions Sent to Pharmacy Refills  
 pravastatin (PRAVACHOL) 10 mg tablet 3 Sig: Take 1 Tab by mouth daily. Class: Normal  
 Pharmacy: [address] [place] [address] #: [phone] Route: Oral  
  
Follow-up Instructions Return in about 3 months (around 6/1/2017). Patient Instructions Depression and Chronic Disease: Care Instructions Your Care Instructions A chronic disease is one that you have for a long time. Some chronic diseases can be controlled, but they usually cannot be cured. Depression is common in people with chronic diseases, but it often goes unnoticed. Many people have concerns about seeking treatment for a mental health problem. You may think it's a sign of weakness, or you don't want people to know about it. It's important to overcome these reasons for not seeking treatment. Treating depression or anxiety is good for your health. Follow-up care is a key part of your treatment and safety. Be sure to make and go to all appointments, and call your doctor if you are having problems. It's also a good idea to know your test results and keep a list of the medicines you take. How can you care for yourself at home? Watch for symptoms of depression The symptoms of depression are often subtle at first. You may think they are caused by your disease rather than depression. Or you may think it is normal to be depressed when you have a chronic disease. If you are depressed you may: · Feel sad or hopeless. · Feel guilty or worthless. · Not enjoy the things you used to enjoy. · Feel hopeless, as though life is not worth living. · Have trouble thinking or remembering. · Have low energy, and you may not eat or sleep well. · Pull away from others. · Think often about death or killing yourself. (Keep the numbers for these national suicide hotlines: 0-037-468-TALK [1-217.868.5537] and 7-139-XKQTHGK [1-265.188.5649]. ) Get treatment By treating your depression, you can feel more hopeful and have more energy. If you feel better, you may take better care of yourself, so your health may improve. · Talk to your doctor if you have any changes in mood during treatment for your disease. · Ask your doctor for help. Counseling, antidepressant medicine, or a combination of the two can help most people with depression. Often a combination works best. Counseling can also help you cope with having a chronic disease. When should you call for help? Call 911 anytime you think you may need emergency care. For example, call if: 
· You feel like hurting yourself or someone else. · Someone you know has depression and is about to attempt or is attempting suicide. Call your doctor now or seek immediate medical care if: 
· You hear voices. · Someone you know has depression and: 
¨ Starts to give away his or her possessions. ¨ Uses illegal drugs or drinks alcohol heavily. ¨ Talks or writes about death, including writing suicide notes or talking about guns, knives, or pills. ¨ Starts to spend a lot of time alone. ¨ Acts very aggressively or suddenly appears calm. Watch closely for changes in your health, and be sure to contact your doctor if: 
· You do not get better as expected. Where can you learn more? Go to http://angela-ashley.info/. Enter V417 in the search box to learn more about \"Depression and Chronic Disease: Care Instructions. \" Current as of: July 26, 2016 Content Version: 11.1 © 1428-1954 [employer]. Care instructions adapted under license by [employer] (which disclaims liability or warranty for this information). If you have questions about a medical condition or this instruction, always ask your healthcare professional. [place] [address] any warranty or liability for your use of this information. Depression and Chronic Disease: Care Instructions Your Care Instructions A chronic disease is one that you have for a long time. Some chronic diseases can be controlled, but they usually cannot be cured. Depression is common in people with chronic diseases, but it often goes unnoticed. Many people have concerns about seeking treatment for a mental health problem. You may think it's a sign of weakness, or you don't want people to know about it. It's important to overcome these reasons for not seeking treatment. Treating depression or anxiety is good for your health. Follow-up care is a key part of your treatment and safety. Be sure to make and go to all appointments, and call your doctor if you are having problems. It's also a good idea to know your test results and keep a list of the medicines you take. How can you care for yourself at home? Watch for symptoms of depression The symptoms of depression are often subtle at first. You may think they are caused by your disease rather than depression. Or you may think it is normal to be depressed when you have a chronic disease. If you are depressed you may: · Feel sad or hopeless. · Feel guilty or worthless. · Not enjoy the things you used to enjoy. · Feel hopeless, as though life is not worth living. · Have trouble thinking or remembering. · Have low energy, and you may not eat or sleep well. · Pull away from others. · Think often about death or killing yourself. (Keep the numbers for these national suicide hotlines: 2-596-011-TALK [1-221.746.7272] and 3-969-ICPDZYK [1-126.363.6526]. ) Get treatment By treating your depression, you can feel more hopeful and have more energy. If you feel better, you may take better care of yourself, so your health may improve. · Talk to your doctor if you have any changes in mood during treatment for your disease. · Ask your doctor for help. Counseling, antidepressant medicine, or a combination of the two can help most people with depression. Often a combination works best. Counseling can also help you cope with having a chronic disease. When should you call for help? Call 911 anytime you think you may need emergency care. For example, call if: 
· You feel like hurting yourself or someone else. · Someone you know has depression and is about to attempt or is attempting suicide. Call your doctor now or seek immediate medical care if: 
· You hear voices. · Someone you know has depression and: 
¨ Starts to give away his or her possessions. ¨ Uses illegal drugs or drinks alcohol heavily. ¨ Talks or writes about death, including writing suicide notes or talking about guns, knives, or pills. ¨ Starts to spend a lot of time alone. ¨ Acts very aggressively or suddenly appears calm. Watch closely for changes in your health, and be sure to contact your doctor if: 
· You do not get better as expected. Where can you learn more? Go to http://angela-ashley.info/. Enter Z160 in the search box to learn more about \"Depression and Chronic Disease: Care Instructions. \" Current as of: July 26, 2016 Content Version: 11.1 © 8905-5754 Healthwise, Incorporated. Care instructions adapted under license by mChron (which disclaims liability or warranty for this information). If you have questions about a medical condition or this instruction, always ask your healthcare professional. [address] any warranty or liability for your use of this information. Introducing [facility]! Dear Karel Morgan: Thank you for requesting a Bazaarvoice account. Our records indicate that you already have an active Bazaarvoice account. You can access your account anytime at [email or web address] Group Did you know that you can access your hospital and ER discharge instructions at any time in Bazaarvoice? You can also review all of your test results from your hospital stay or ER visit. Additional Information If you have questions, please visit the Frequently Asked Questions section of the Bazaarvoice website at [email or web address] Remember, Bazaarvoice is NOT to be used for urgent needs. For medical emergencies, dial 911. Now available from your iPhone and Android! Please provide this summary of care documentation to your next provider. Your primary care clinician is listed as Tarsha Cerrato. If you have any questions after today's visit, please call [phone].

## 2017-05-25 DIAGNOSIS — F32.A DEPRESSION, UNSPECIFIED DEPRESSION TYPE: ICD-10-CM

## 2017-05-26 RX ORDER — TRAZODONE HYDROCHLORIDE 50 MG/1
TABLET ORAL
Qty: 30 TAB | Refills: 0 | Status: SHIPPED | COMMUNITY
Start: 2017-05-26 | End: 2017-06-02 | Stop reason: SDUPTHER

## 2017-05-29 RX ORDER — TRAZODONE HYDROCHLORIDE 50 MG/1
50 TABLET ORAL
Qty: 30 TAB | Refills: 2 | Status: SHIPPED | OUTPATIENT
Start: 2017-05-29 | End: 2017-07-24 | Stop reason: SDUPTHER

## 2017-06-02 ENCOUNTER — OFFICE VISIT (OUTPATIENT)
Dept: FAMILY MEDICINE CLINIC | Age: 62
End: 2017-06-02

## 2017-06-02 VITALS
WEIGHT: 146.2 LBS | OXYGEN SATURATION: 98 % | HEIGHT: 61 IN | SYSTOLIC BLOOD PRESSURE: 138 MMHG | DIASTOLIC BLOOD PRESSURE: 78 MMHG | HEART RATE: 91 BPM | RESPIRATION RATE: 16 BRPM | BODY MASS INDEX: 27.6 KG/M2

## 2017-06-02 DIAGNOSIS — M79.645 PAIN OF LEFT MIDDLE FINGER: ICD-10-CM

## 2017-06-02 DIAGNOSIS — F32.A DEPRESSION, UNSPECIFIED DEPRESSION TYPE: Primary | ICD-10-CM

## 2017-06-02 NOTE — PROGRESS NOTES
Chief Complaint   Patient presents with    Depression    Finger Pain     left hand     Patient states she is here for follow on depression and has intermittent finger pain of left hand.

## 2017-06-02 NOTE — MR AVS SNAPSHOT
Visit Information Date & Time Provider Department Dept. Phone Encounter #  
 6/2/2017  9:45 AM Amor Espinal, [address] [id number] Follow-up Instructions Return in about 6 months (around 12/2/2017). Upcoming Health Maintenance Date Due INFLUENZA AGE 9 TO ADULT 8/1/2017 BREAST CANCER SCRN MAMMOGRAM 11/14/2018 COLONOSCOPY 6/1/2022 DTaP/Tdap/Td series (2 - Td) 4/24/2025 Allergies as of 6/2/2017  Review Complete On: 6/2/2017 By: Amor Espinal MD  
  
 Severity Noted Reaction Type Reactions Adhesive Tape-silicones  72/44/7537    Hives Azithromycin  01/13/2011    Hives Buspar [Buspirone]  04/24/2015    Palpitations Clindamycin  01/13/2011    Hives Pcn [Penicillins]  01/13/2011    Hives Statins-hmg-coa Reductase Inhibitors  10/13/2015    Other (comments) Leg cramps Current Immunizations  Never Reviewed Name Date Tdap 4/24/2015 Not reviewed this visit You Were Diagnosed With   
  
 Codes Comments Depression, unspecified depression type    -  Primary ICD-10-CM: F32.9 ICD-9-CM: 221 Pain of left middle finger     ICD-10-CM: M79.645 ICD-9-CM: 729.5 Vitals BP Pulse Resp Height(growth percentile) Weight(growth percentile) SpO2  
 138/78 (BP 1 Location: Left arm, BP Patient Position: Sitting) 91 16 5' 1\" (1.549 m) 146 lb 3.2 oz (66.3 kg) 98% BMI OB Status Smoking Status 27.62 kg/m2 Hysterectomy Former Smoker BMI and BSA Data Body Mass Index Body Surface Area  
 27.62 kg/m 2 1.69 m 2 Preferred Pharmacy Pharmacy Name Phone [address], [place] [address] [place] [address] [facility] [phone] Your Updated Medication List  
  
   
This list is accurate as of: 6/2/17 10:16 AM.  Always use your most recent med list.  
  
  
  
  
 betamethasone dipropionate 0.05 % ointment Commonly known as:  Kane Duncan  
  
 CHROMIUM PO Take  by mouth. CO Q-10 PO Take 1 Cap by mouth daily. FIBER GUMMIES PO Take  by mouth. FISH OIL PO Take  by mouth. FLUoxetine 20 mg capsule Commonly known as:  PROzac Take 1 Cap by mouth daily. GAS-X PO Take  by mouth.  
  
 ketoconazole 2 % topical cream  
Commonly known as:  NIZORAL MULTIVITAMIN PO Take 1 Tab by mouth daily. OTHER  
1 Tab daily. Vitamin B with Vitamin C - 1 tab daily  
  
 pravastatin 10 mg tablet Commonly known as:  PRAVACHOL Take 1 Tab by mouth daily. PROBIOTIC PO Take  by mouth. traZODone 50 mg tablet Commonly known as:  [place] Take 1 Tab by mouth nightly. Follow-up Instructions Return in about 6 months (around 12/2/2017). Patient Instructions Recovering From Depression: Care Instructions Your Care Instructions Taking good care of yourself is important as you recover from depression. In time, your symptoms will fade as your treatment takes hold. Do not give up. Instead, focus your energy on getting better. Your mood will improve. It just takes some time. Focus on things that can help you feel better, such as being with friends and family, eating well, and getting enough rest. But take things slowly. Do not do too much too soon. You will begin to feel better gradually. Follow-up care is a key part of your treatment and safety. Be sure to make and go to all appointments, and call your doctor if you are having problems. It's also a good idea to know your test results and keep a list of the medicines you take. How can you care for yourself at home? Be realistic · If you have a large task to do, break it up into smaller steps you can handle, and just do what you can. · You may want to put off important decisions until your depression has lifted.  If you have plans that will have a major impact on your life, such as marriage, divorce, or a job change, try to wait a bit. Talk it over with friends and loved ones who can help you look at the overall picture first. 
· Reaching out to people for help is important. Do not isolate yourself. Let your family and friends help you. Find someone you can trust and confide in, and talk to that person. · Be patient, and be kind to yourself. Remember that depression is not your fault and is not something you can overcome with willpower alone. Treatment is necessary for depression, just like for any other illness. Feeling better takes time, and your mood will improve little by little. Stay active · Stay busy and get outside. Take a walk, or try some other light exercise. · Talk with your doctor about an exercise program. Exercise can help with mild depression. · Go to a movie or concert. Take part in a [employer] activity or other social gathering. Go to a ball game. · Ask a friend to have dinner with you. Take care of yourself · Eat a balanced diet with plenty of fresh fruits and vegetables, whole grains, and lean protein. If you have lost your appetite, eat small snacks rather than large meals. · Avoid drinking alcohol or using illegal drugs. Do not take medicines that have not been prescribed for you. They may interfere with medicines you may be taking for depression, or they may make your depression worse. · Take your medicines exactly as they are prescribed. You may start to feel better within 1 to 3 weeks of taking antidepressant medicine. But it can take as many as 6 to 8 weeks to see more improvement. If you have questions or concerns about your medicines, or if you do not notice any improvement by 3 weeks, talk to your doctor. · If you have any side effects from your medicine, tell your doctor. Antidepressants can make you feel tired, dizzy, or nervous. Some people have dry mouth, constipation, headaches, sexual problems, or diarrhea. Many of these side effects are mild and will go away on their own after you have been taking the medicine for a few weeks. Some may last longer. Talk to your doctor if side effects are bothering you too much. You might be able to try a different medicine. · Get enough sleep. If you have problems sleeping: ¨ Go to bed at the same time every night, and get up at the same time every morning. ¨ Keep your bedroom dark and quiet. ¨ Do not exercise after 5:00 p.m. ¨ Avoid drinks with caffeine after 5:00 p.m. · Avoid sleeping pills unless they are prescribed by the doctor treating your depression. Sleeping pills may make you groggy during the day, and they may interact with other medicine you are taking. · If you have any other illnesses, such as diabetes, heart disease, or high blood pressure, make sure to continue with your treatment. Tell your doctor about all of the medicines you take, including those with or without a prescription. · Keep the numbers for these national suicide hotlines: 0-279-746-TALK (1-347.197.5685) and 1-513-ZESIGPB (8-127.532.6727). If you or someone you know talks about suicide or feeling hopeless, get help right away. When should you call for help? Call 911 anytime you think you may need emergency care. For example, call if: 
· You feel like hurting yourself or someone else. · Someone you know has depression and is about to attempt or is attempting suicide. Call your doctor now or seek immediate medical care if: 
· You hear voices. · Someone you know has depression and: 
¨ Starts to give away his or her possessions. ¨ Uses illegal drugs or drinks alcohol heavily. ¨ Talks or writes about death, including writing suicide notes or talking about guns, knives, or pills. ¨ Starts to spend a lot of time alone. ¨ Acts very aggressively or suddenly appears calm. Watch closely for changes in your health, and be sure to contact your doctor if: 
· You do not get better as expected. Where can you learn more? Go to http://angela-ashley.info/. Enter X650 in the search box to learn more about \"Recovering From Depression: Care Instructions. \" Current as of: July 26, 2016 Content Version: 11.2 © 7921-7088 BountyJobs, oohilove. Care instructions adapted under license by [employer]ight (which disclaims liability or warranty for this information). If you have questions about a medical condition or this instruction, always ask your healthcare professional. [address] any warranty or liability for your use of this information. Introducing [facility]! Dear Tay Madera: Thank you for requesting a Keepstream account. Our records indicate that you already have an active Keepstream account. You can access your account anytime at [email or web address] Did you know that you can access your hospital and ER discharge instructions at any time in Keepstream? You can also review all of your test results from your hospital stay or ER visit. Additional Information If you have questions, please visit the Frequently Asked Questions section of the Keepstream website at [email or web address] Remember, Keepstream is NOT to be used for urgent needs. For medical emergencies, dial 911. Now available from your iPhone and Android! Please provide this summary of care documentation to your next provider. Your primary care clinician is listed as Alfonso Alanis. If you have any questions after today's visit, please call [phone].

## 2017-06-02 NOTE — PATIENT INSTRUCTIONS
Recovering From Depression: Care Instructions  Your Care Instructions  Taking good care of yourself is important as you recover from depression. In time, your symptoms will fade as your treatment takes hold. Do not give up. Instead, focus your energy on getting better. Your mood will improve. It just takes some time. Focus on things that can help you feel better, such as being with friends and family, eating well, and getting enough rest. But take things slowly. Do not do too much too soon. You will begin to feel better gradually. Follow-up care is a key part of your treatment and safety. Be sure to make and go to all appointments, and call your doctor if you are having problems. It's also a good idea to know your test results and keep a list of the medicines you take. How can you care for yourself at home? Be realistic  · If you have a large task to do, break it up into smaller steps you can handle, and just do what you can. · You may want to put off important decisions until your depression has lifted. If you have plans that will have a major impact on your life, such as marriage, divorce, or a job change, try to wait a bit. Talk it over with friends and loved ones who can help you look at the overall picture first.  · Reaching out to people for help is important. Do not isolate yourself. Let your family and friends help you. Find someone you can trust and confide in, and talk to that person. · Be patient, and be kind to yourself. Remember that depression is not your fault and is not something you can overcome with willpower alone. Treatment is necessary for depression, just like for any other illness. Feeling better takes time, and your mood will improve little by little. Stay active  · Stay busy and get outside. Take a walk, or try some other light exercise. · Talk with your doctor about an exercise program. Exercise can help with mild depression. · Go to a movie or concert.  Take part in a [employer] activity or other social gathering. Go to a [employer] game. · Ask a friend to have dinner with you. Take care of yourself  · Eat a balanced diet with plenty of fresh fruits and vegetables, whole grains, and lean protein. If you have lost your appetite, eat small snacks rather than large meals. · Avoid drinking alcohol or using illegal drugs. Do not take medicines that have not been prescribed for you. They may interfere with medicines you may be taking for depression, or they may make your depression worse. · Take your medicines exactly as they are prescribed. You may start to feel better within 1 to 3 weeks of taking antidepressant medicine. But it can take as many as 6 to 8 weeks to see more improvement. If you have questions or concerns about your medicines, or if you do not notice any improvement by 3 weeks, talk to your doctor. · If you have any side effects from your medicine, tell your doctor. Antidepressants can make you feel tired, dizzy, or nervous. Some people have dry mouth, constipation, headaches, sexual problems, or diarrhea. Many of these side effects are mild and will go away on their own after you have been taking the medicine for a few weeks. Some may last longer. Talk to your doctor if side effects are bothering you too much. You might be able to try a different medicine. · Get enough sleep. If you have problems sleeping:  ¨ Go to bed at the same time every night, and get up at the same time every morning. ¨ Keep your bedroom dark and quiet. ¨ Do not exercise after 5:00 p.m. ¨ Avoid drinks with caffeine after 5:00 p.m. · Avoid sleeping pills unless they are prescribed by the doctor treating your depression. Sleeping pills may make you groggy during the day, and they may interact with other medicine you are taking. · If you have any other illnesses, such as diabetes, heart disease, or high blood pressure, make sure to continue with your treatment.  Tell your doctor about all of the medicines you take, including those with or without a prescription. · Keep the numbers for these national suicide hotlines: 3-134-053-TALK (0-201.574.1444) and 6-049-GBOYKRQ (8-146.842.9278). If you or someone you know talks about suicide or feeling hopeless, get help right away. When should you call for help? Call 911 anytime you think you may need emergency care. For example, call if:  · You feel like hurting yourself or someone else. · Someone you know has depression and is about to attempt or is attempting suicide. Call your doctor now or seek immediate medical care if:  · You hear voices. · Someone you know has depression and:  ¨ Starts to give away his or her possessions. ¨ Uses illegal drugs or drinks alcohol heavily. ¨ Talks or writes about death, including writing suicide notes or talking about guns, knives, or pills. ¨ Starts to spend a lot of time alone. ¨ Acts very aggressively or suddenly appears calm. Watch closely for changes in your health, and be sure to contact your doctor if:  · You do not get better as expected. Where can you learn more? Go to http://angela-ashley.info/. Enter J856 in the search box to learn more about \"Recovering From Depression: Care Instructions. \"  Current as of: July 26, 2016  Content Version: 11.2  © 6620-1876 [employer]. Care instructions adapted under license by [employer] (which disclaims liability or warranty for this information). If you have questions about a medical condition or this instruction, always ask your healthcare professional. [place] [address] any warranty or liability for your use of this information.

## 2017-06-02 NOTE — PROGRESS NOTES
HISTORY OF PRESENT ILLNESS  Venkatesh Arroyo is a 58 y.o. female. HPI: here for follow up on depression. Going on since long time. On and off. Now on medication. Shows compliance with medication. Stable symptoms at this time. Now put her house on sell. Worried about that. Her prior counselor retired. Bronwyn Padilla not found the new one yet. Encourage her to make an appt with counselor and she agreed to that. Said sleeping fair. Not much crying episodes. Trying to motivate herself. Recently her daughter visited and she was out a lot and now tired so does not go out much. Some weight gain due to eating habits and she will try to go back to her routine. No other specific concern. Has lost her fried and also got  so was tough time for her and now getting settled/.   Denies any headache, dizziness, no chest pain or trouble breathing, no arm or leg weakness. No nausea or vomiting, no appetite changes. No urine or bowel complains, no palpitation, no diaphoresis. Visit Vitals    /78 (BP 1 Location: Left arm, BP Patient Position: Sitting)    Pulse 91    Resp 16    Ht 5' 1\" (1.549 m)    Wt 146 lb 3.2 oz (66.3 kg)    SpO2 98%    BMI 27.62 kg/m2     Review medication list, vitals, problem list,allergies. Review prior labs. Lab Results   Component Value Date/Time    Sodium 141 11/01/2016 10:36 AM    Potassium 5.0 11/01/2016 10:36 AM    Chloride 104 11/01/2016 10:36 AM    CO2 31 11/01/2016 10:36 AM    Anion gap 6 11/01/2016 10:36 AM    Glucose 97 11/01/2016 10:36 AM    BUN 11 11/01/2016 10:36 AM    Creatinine 0.60 11/01/2016 10:36 AM    BUN/Creatinine ratio 18 11/01/2016 10:36 AM    GFR est AA >60 11/01/2016 10:36 AM    GFR est non-AA >60 11/01/2016 10:36 AM    Calcium 9.1 11/01/2016 10:36 AM    Bilirubin, total 0.4 11/01/2016 10:36 AM    AST (SGOT) 16 11/01/2016 10:36 AM    Alk.  phosphatase 42 11/01/2016 10:36 AM    Protein, total 7.2 11/01/2016 10:36 AM    Albumin 4.0 11/01/2016 10:36 AM    Globulin 3.2 11/01/2016 10:36 AM    A-G Ratio 1.3 11/01/2016 10:36 AM    ALT (SGPT) 18 11/01/2016 10:36 AM     Lab Results   Component Value Date/Time    TSH 2.330 04/06/2015 12:00 AM     Lab Results   Component Value Date/Time    Cholesterol, total 219 11/01/2016 10:36 AM    HDL Cholesterol 83 11/01/2016 10:36 AM    LDL, calculated 119.8 11/01/2016 10:36 AM    VLDL, calculated 16.2 11/01/2016 10:36 AM    Triglyceride 81 11/01/2016 10:36 AM    CHOL/HDL Ratio 2.6 11/01/2016 10:36 AM     Lab Results   Component Value Date/Time    Hemoglobin A1c 5.0 11/01/2016 10:36 AM     Said pain on and off over left middle finger over 3 rd phalanges. No swelling or redness. No pain at this time. Said sometimes. No injury or fall. No tingling or numbness. No stiffness. ROS: see HPI     Physical Exam   Constitutional: She is oriented to person, place, and time. No distress. Neck: No thyromegaly present. Cardiovascular: Normal rate, regular rhythm and normal heart sounds. Pulmonary/Chest:   CTA   Abdominal: Soft. Bowel sounds are normal. There is no tenderness. Musculoskeletal: She exhibits no edema. Neurological: She is oriented to person, place, and time. Psychiatric: Her behavior is normal. Thought content normal.       ASSESSMENT and PLAN    ICD-10-CM ICD-9-CM    1. Depression, unspecified depression type: for now stable on current medication. No side effects. Will continue current plan and advised her to get establish with counselor. Labs will be order next visit. F32.9 311    2. Pain of left middle finger: no swelling or redness or tenderness at this time. Will observe. Advised to do ice and motrin as needed with food as needed for pain. M79.645 729.5    Pt understood and agrees with above plan. Review HM   Per patient per GI office she is due for colonoscopy in 10 years. Will obtain records. Follow-up Disposition:  Return in about 6 months (around 12/2/2017).

## 2017-07-24 ENCOUNTER — OFFICE VISIT (OUTPATIENT)
Dept: FAMILY MEDICINE CLINIC | Age: 62
End: 2017-07-24

## 2017-07-24 VITALS
BODY MASS INDEX: 27.19 KG/M2 | HEIGHT: 61 IN | DIASTOLIC BLOOD PRESSURE: 78 MMHG | OXYGEN SATURATION: 99 % | SYSTOLIC BLOOD PRESSURE: 136 MMHG | RESPIRATION RATE: 16 BRPM | HEART RATE: 82 BPM | TEMPERATURE: 98.3 F | WEIGHT: 144 LBS

## 2017-07-24 DIAGNOSIS — F32.A DEPRESSION, UNSPECIFIED DEPRESSION TYPE: Primary | ICD-10-CM

## 2017-07-24 DIAGNOSIS — E78.5 HYPERLIPIDEMIA, UNSPECIFIED HYPERLIPIDEMIA TYPE: ICD-10-CM

## 2017-07-24 DIAGNOSIS — M79.18 MUSCULOSKELETAL PAIN: ICD-10-CM

## 2017-07-24 RX ORDER — TRAZODONE HYDROCHLORIDE 50 MG/1
50 TABLET ORAL
Qty: 90 TAB | Refills: 0 | Status: SHIPPED | OUTPATIENT
Start: 2017-07-24

## 2017-07-24 RX ORDER — CYCLOBENZAPRINE HCL 5 MG
5 TABLET ORAL
Qty: 30 TAB | Refills: 0 | Status: SHIPPED | OUTPATIENT
Start: 2017-07-24

## 2017-07-24 RX ORDER — PRAVASTATIN SODIUM 10 MG/1
10 TABLET ORAL DAILY
Qty: 90 TAB | Refills: 0 | Status: SHIPPED | OUTPATIENT
Start: 2017-07-24

## 2017-07-24 NOTE — MR AVS SNAPSHOT
Visit Information Date & Time Provider Department Dept. Phone Encounter #  
 7/24/2017  2:45 PM Levi Vines, [address] [id number] Follow-up Instructions Return for moving out of town . Upcoming Health Maintenance Date Due INFLUENZA AGE 9 TO ADULT 8/1/2017 BREAST CANCER SCRN MAMMOGRAM 11/14/2018 DTaP/Tdap/Td series (2 - Td) 4/24/2025 COLONOSCOPY 5/16/2027 Allergies as of 7/24/2017  Review Complete On: 7/24/2017 By: Levi Vines MD  
  
 Severity Noted Reaction Type Reactions Adhesive Tape-silicones  16/44/4289    Hives Azithromycin  01/13/2011    Hives Buspar [Buspirone]  04/24/2015    Palpitations Clindamycin  01/13/2011    Hives Pcn [Penicillins]  01/13/2011    Hives Statins-hmg-coa Reductase Inhibitors  10/13/2015    Other (comments) Leg cramps Current Immunizations  Never Reviewed Name Date Tdap 4/24/2015 Not reviewed this visit You Were Diagnosed With   
  
 Codes Comments Depression, unspecified depression type    -  Primary ICD-10-CM: F32.9 ICD-9-CM: 893 Hyperlipidemia, unspecified hyperlipidemia type     ICD-10-CM: E78.5 ICD-9-CM: 272.4 Musculoskeletal pain     ICD-10-CM: M79.1 ICD-9-CM: 729.1 Vitals BP Pulse Temp Resp Height(growth percentile) Weight(growth percentile) 136/78 (BP 1 Location: Left arm, BP Patient Position: Sitting) 82 98.3 °F (36.8 °C) (Oral) 16 5' 1\" (1.549 m) 144 lb (65.3 kg) SpO2 BMI OB Status Smoking Status 99% 27.21 kg/m2 Hysterectomy Former Smoker BMI and BSA Data Body Mass Index Body Surface Area  
 27.21 kg/m 2 1.68 m 2 Preferred Pharmacy Pharmacy Name Phone [address], [address] [place] [phone] [facility] [phone] Your Updated Medication List  
  
   
This list is accurate as of: 7/24/17  3:21 PM.  Always use your most recent med list.  
  
 CHROMIUM PO Take  by mouth. CO Q-10 PO Take 1 Cap by mouth daily. cyclobenzaprine 5 mg tablet Commonly known as:  FLEXERIL Take 1 Tab by mouth nightly as needed for Muscle Spasm(s). FIBER GUMMIES PO Take  by mouth. FISH OIL PO Take  by mouth. GAS-X PO Take  by mouth.  
  
 ketoconazole 2 % topical cream  
Commonly known as:  NIZORAL MULTIVITAMIN PO Take 1 Tab by mouth daily. OTHER  
1 Tab daily. Vitamin B with Vitamin C - 1 tab daily  
  
 pravastatin 10 mg tablet Commonly known as:  PRAVACHOL Take 1 Tab by mouth daily. PROBIOTIC PO Take  by mouth. traZODone 50 mg tablet Commonly known as:  Teryl Stateline Take 1 Tab by mouth nightly. Prescriptions Printed Refills  
 traZODone (DESYREL) 50 mg tablet 0 Sig: Take 1 Tab by mouth nightly. Class: Print Route: Oral  
 pravastatin (PRAVACHOL) 10 mg tablet 0 Sig: Take 1 Tab by mouth daily. Class: Print Route: Oral  
  
Prescriptions Sent to Pharmacy Refills  
 cyclobenzaprine (FLEXERIL) 5 mg tablet 0 Sig: Take 1 Tab by mouth nightly as needed for Muscle Spasm(s). Class: Normal  
 Pharmacy: [address] [place] [address] #: [phone] Route: Oral  
  
Follow-up Instructions Return for moving out of [facility] . Introducing [facility]! Dear Marcia Crain: Thank you for requesting a [employer] account. Our records indicate that you already have an active [employer] account. You can access your account anytime at [email or web address]. [email or web address] Did you know that you can access your hospital and ER discharge instructions at any time in [employer]? You can also review all of your test results from your hospital stay or ER visit. Additional Information If you have questions, please visit the Frequently Asked Questions section of the [employer] website at [email or web address] Remember, [employer] is NOT to be used for urgent needs. For medical emergencies, dial 911. Now available from your iPhone and Android! Please provide this summary of care documentation to your next provider. Your primary care clinician is listed as Sixto Diego. If you have any questions after today's visit, please call [phone].

## 2017-07-24 NOTE — PROGRESS NOTES
1. Have you been to the ER, urgent care clinic since your last visit? Hospitalized since your last visit? No    2. Have you seen or consulted any other health care providers outside of the [address] [place] [address] since your last visit? Include any pap smears or colon screening. No    Patient is moving out of the area on Monday. Also, patient wants to discuss getting a printed Rx for diazepam due to pain.

## 2017-07-24 NOTE — PROGRESS NOTES
HISTORY OF PRESENT ILLNESS  Brenda Radford is a 58 y.o. female. HPI: Here for routine follow up. Wanted 3 months refill of medication as [place] is [place] out of state. No specific concern except having mid and lower back pain while moving boxes during her move. No radiation of pain. More localized. No loss of urine or bowel control. No lower ext tingling ,numbness or weakness. Taking otc medication which helps little bit. Currently mild intensity discomfort. Banding makes it worse. Also h/o depression. Currently following counselor and it is helping. Now moving out of town so advised her to find a new counselor. She will do that. Current medication is helping her for depression. No mood changes. No appetite or weight changes. Shows compliance with medication. No side effects. No panic attacks. Sleep is fair. No suicidal ideation. Visit Vitals    /78 (BP 1 Location: Left arm, BP Patient Position: Sitting)    Pulse 82    Temp 98.3 °F (36.8 °C) (Oral)    Resp 16    Ht 5' 1\" (1.549 m)    Wt 144 lb (65.3 kg)    SpO2 99%    BMI 27.21 kg/m2     Review medication list, vitals, problem list,allergies. H/o hyperlipidemia. On medication. No side effects. Also on diet modification. Review labs. Lab Results   Component Value Date/Time    Sodium 141 11/01/2016 10:36 AM    Potassium 5.0 11/01/2016 10:36 AM    Chloride 104 11/01/2016 10:36 AM    CO2 31 11/01/2016 10:36 AM    Anion gap 6 11/01/2016 10:36 AM    Glucose 97 11/01/2016 10:36 AM    BUN 11 11/01/2016 10:36 AM    Creatinine 0.60 11/01/2016 10:36 AM    BUN/Creatinine ratio 18 11/01/2016 10:36 AM    GFR est AA >60 11/01/2016 10:36 AM    GFR est non-AA >60 11/01/2016 10:36 AM    Calcium 9.1 11/01/2016 10:36 AM    Bilirubin, total 0.4 11/01/2016 10:36 AM    AST (SGOT) 16 11/01/2016 10:36 AM    Alk.  phosphatase 42 11/01/2016 10:36 AM    Protein, total 7.2 11/01/2016 10:36 AM    Albumin 4.0 11/01/2016 10:36 AM    Globulin 3.2 11/01/2016 10:36 AM    JOSEPH Ratio 1.3 11/01/2016 10:36 AM    ALT (SGPT) 18 11/01/2016 10:36 AM     Lab Results   Component Value Date/Time    TSH 2.330 04/06/2015 12:00 AM     Lab Results   Component Value Date/Time    Cholesterol, total 219 11/01/2016 10:36 AM    HDL Cholesterol 83 11/01/2016 10:36 AM    LDL, calculated 119.8 11/01/2016 10:36 AM    VLDL, calculated 16.2 11/01/2016 10:36 AM    Triglyceride 81 11/01/2016 10:36 AM    CHOL/HDL Ratio 2.6 11/01/2016 10:36 AM       ROS: see HPI     Physical Exam   Constitutional: She is oriented to person, place, and time. No distress. Cardiovascular: Normal heart sounds. Pulmonary/Chest: No respiratory distress. She has no wheezes. Musculoskeletal:   Back: generalize discomfort over mid and lower back. Around lower thoracic and upper lumbar spine. Paraspinal muscle discomfort around that level range of motion wnl. Neurological: She is oriented to person, place, and time. Psychiatric: Her behavior is normal. Thought content normal.       ASSESSMENT and PLAN    ICD-10-CM ICD-9-CM    1. Depression, unspecified depression type: stable on current medication and dosage. Moving out of [facility]. Will given 3 months refill . advised her to get establish with new provider and new counselor. She understood that well. F32.9 311 traZODone (DESYREL) 50 mg tablet   2. Hyperlipidemia, unspecified hyperlipidemia type/ diet modification: given medication refill. E78.5 272.4 pravastatin (PRAVACHOL) 10 mg tablet   3. Musculoskeletal pain/ lower back pain/ in middle of moving : symptomatic treatment. Heating pad, NSAIDs. M79.1 729.1 cyclobenzaprine (FLEXERIL) 5 mg tablet   Pt understood and agrees with above plan. Review    Follow-up Disposition:  Return for moving out of town .